# Patient Record
Sex: MALE | Race: WHITE | NOT HISPANIC OR LATINO | Employment: FULL TIME | URBAN - METROPOLITAN AREA
[De-identification: names, ages, dates, MRNs, and addresses within clinical notes are randomized per-mention and may not be internally consistent; named-entity substitution may affect disease eponyms.]

---

## 2024-10-09 ENCOUNTER — HOSPITAL ENCOUNTER (EMERGENCY)
Facility: HOSPITAL | Age: 34
Discharge: HOME | End: 2024-10-09
Payer: COMMERCIAL

## 2024-10-09 ENCOUNTER — PHARMACY VISIT (OUTPATIENT)
Dept: PHARMACY | Facility: CLINIC | Age: 34
End: 2024-10-09
Payer: MEDICAID

## 2024-10-09 ENCOUNTER — APPOINTMENT (OUTPATIENT)
Dept: URGENT CARE | Age: 34
End: 2024-10-09

## 2024-10-09 VITALS
BODY MASS INDEX: 22.5 KG/M2 | HEIGHT: 66 IN | WEIGHT: 140 LBS | RESPIRATION RATE: 16 BRPM | OXYGEN SATURATION: 97 % | HEART RATE: 90 BPM | DIASTOLIC BLOOD PRESSURE: 88 MMHG | SYSTOLIC BLOOD PRESSURE: 130 MMHG | TEMPERATURE: 98 F

## 2024-10-09 DIAGNOSIS — K08.89 PAIN, DENTAL: Primary | ICD-10-CM

## 2024-10-09 PROCEDURE — 99283 EMERGENCY DEPT VISIT LOW MDM: CPT

## 2024-10-09 PROCEDURE — 2500000001 HC RX 250 WO HCPCS SELF ADMINISTERED DRUGS (ALT 637 FOR MEDICARE OP): Performed by: PHYSICIAN ASSISTANT

## 2024-10-09 PROCEDURE — RXMED WILLOW AMBULATORY MEDICATION CHARGE

## 2024-10-09 RX ORDER — CLINDAMYCIN HYDROCHLORIDE 150 MG/1
300 CAPSULE ORAL ONCE
Status: COMPLETED | OUTPATIENT
Start: 2024-10-09 | End: 2024-10-09

## 2024-10-09 RX ORDER — CLINDAMYCIN HYDROCHLORIDE 300 MG/1
300 CAPSULE ORAL 3 TIMES DAILY
Qty: 30 CAPSULE | Refills: 0 | Status: SHIPPED | OUTPATIENT
Start: 2024-10-09 | End: 2024-10-19

## 2024-10-09 ASSESSMENT — PAIN DESCRIPTION - LOCATION: LOCATION: MOUTH

## 2024-10-09 ASSESSMENT — LIFESTYLE VARIABLES
EVER FELT BAD OR GUILTY ABOUT YOUR DRINKING: NO
HAVE YOU EVER FELT YOU SHOULD CUT DOWN ON YOUR DRINKING: NO
EVER HAD A DRINK FIRST THING IN THE MORNING TO STEADY YOUR NERVES TO GET RID OF A HANGOVER: NO
HAVE PEOPLE ANNOYED YOU BY CRITICIZING YOUR DRINKING: NO
TOTAL SCORE: 0

## 2024-10-09 ASSESSMENT — PAIN DESCRIPTION - ORIENTATION: ORIENTATION: LEFT

## 2024-10-09 ASSESSMENT — PAIN - FUNCTIONAL ASSESSMENT: PAIN_FUNCTIONAL_ASSESSMENT: 0-10

## 2024-10-09 ASSESSMENT — PAIN SCALES - GENERAL: PAINLEVEL_OUTOF10: 8

## 2024-10-09 ASSESSMENT — PAIN DESCRIPTION - PAIN TYPE: TYPE: ACUTE PAIN

## 2024-10-09 NOTE — PROGRESS NOTES
Medication Education     Medication education for Irineo Pinto was provided to the patient  for the following medication(s):  clindamycin    Medication education provided by a Pharmacist:  ADR Counseling Dose, frequency, storage How to take and what to do if a dose is missed How the medication works and benefits of taking it Benefits of taking the medication  Importance of compliance Potential duration of therapy    Identified potential barriers to education:  None    Method(s) of Education:  Verbal    An opportunity to ask questions and receive answers was provided.     Assessment of understanding the patient :  2= meets goals/outcomes    Additional Notes (if applicable):     ADILSON YOUNG

## 2024-10-09 NOTE — ED PROVIDER NOTES
EMERGENCY MEDICINE EVALUATION NOTE    History of Present Illness     Chief Complaint:   Chief Complaint   Patient presents with    Dental Pain     C/O left side dental pain, root canal scheduled at end of the month.       HPI: Irineo Pinto is a 34 y.o. male presents with a chief complaint of left-sided dental pain and swelling.  Patient reports that there is swelling on the left lower jawline.  He reports that he is not sure which tooth is bothering him but he has some swelling along the jawline and is concerned for infection.  He states that he did schedule an appointment with his dentist but they were unable to get him until the end of the month.  Patient denies difficulty breathing difficulty swallowing but he is concerned for infection.  Patient reports that he takes Tylenol Motrin at home which does relieve his pain.  Patient denies any fevers or chills.    Previous History   No past medical history on file.  No past surgical history on file.     No family history on file.  No Known Allergies  Current Outpatient Medications   Medication Instructions    clindamycin (CLEOCIN) 300 mg, oral, 3 times daily       Physical Exam     Appearance: Alert, oriented , cooperative     Skin: Intact,  dry skin, no lesions, rash, petechiae or purpura.      Eyes: PERRLA, EOMs intact,  Conjunctiva pink      ENT: Hearing grossly intact. Pharynx clear, uvula midline.  Patient has significant swelling to his left lower jawline but nothing possibly under the mandible.  There is no submandibular swelling, lockjaw, or trismus.  Patient speaking in full sentences and able to handle secretions appropriately.     Neck: Supple. Trachea at midline.      Pulmonary: Clear bilaterally. No rales, rhonchi or wheezing. No accessory muscle use or stridor.     Cardiac: Normal rate and rhythm without murmur     Abdomen: Soft, nontender, active bowel sounds.     Musculoskeletal: Full range of motion.      Neurological:Cranial nerves II through XII  "are grossly intact, normal sensation, no weakness, no focal findings identified.     Results   Labs Reviewed - No data to display  No orders to display         ED Course & Medical Decision Making     Medications   clindamycin (Cleocin) capsule 300 mg (300 mg oral Given 10/9/24 1449)     Heart Rate:  []   Temperature:  [36.7 °C (98 °F)-36.8 °C (98.2 °F)]   Respirations:  [16-20]   BP: (130-134)/(88-90)   Height:  [167.6 cm (5' 6\")]   Weight:  [63.5 kg (140 lb)]   Pulse Ox:  [97 %-100 %]    ED Course as of 10/09/24 1510   Wed Oct 09, 2024   1442 Patient has swelling on the left lower jawline.  Patient has secretions and able to speak in full sentences.  No submandibular swelling, lockjaw, or trismus.  Musculoskeletal the patient.  He states he is fine taking Tylenol Motrin at home he already has a dental appointment scheduled.  He states that he is concern for spread of infection would like antibiotics.  Patient given 1 dose of clindamycin here in the emergency department sent home with minimizing home.  Patient was encouraged to follow-up with his dentist or return here immediately with any worsening symptoms. [CJ]      ED Course User Index  [CJ] Joe Goldman PA-C         Diagnoses as of 10/09/24 1510   Pain, dental       Procedures   Procedures    Diagnosis     1. Pain, dental        Disposition   Discharged    ED Prescriptions       Medication Sig Dispense Start Date End Date Auth. Provider    clindamycin (Cleocin) 300 mg capsule Take 1 capsule (300 mg) by mouth 3 times a day for 10 days. 30 capsule 10/9/2024 10/19/2024 Joe Goldman PA-C            Disclaimer: This note was dictated by speech recognition. Minor errors in transcription may be present. Please call if questions.       Joe Goldman PA-C  10/09/24 1513    "

## 2024-10-09 NOTE — ED NOTES
Pt OK for d/c home per provider. All results and findings discussed with patient by provider. Home care and follow up instructions provided to patient. All questions answered. Pt verbalized understanding of all information. Pt A&Ox4, resp easy, skin warm dry and of appropriate color. Departs ED with steady gait.      Dayan Canales RN  10/09/24 1500

## 2024-12-19 ENCOUNTER — APPOINTMENT (OUTPATIENT)
Dept: UROLOGY | Facility: CLINIC | Age: 34
End: 2024-12-19
Payer: COMMERCIAL

## 2024-12-19 VITALS
SYSTOLIC BLOOD PRESSURE: 170 MMHG | DIASTOLIC BLOOD PRESSURE: 75 MMHG | BODY MASS INDEX: 24.16 KG/M2 | HEART RATE: 75 BPM | WEIGHT: 145 LBS | HEIGHT: 65 IN

## 2024-12-19 DIAGNOSIS — Z30.09 FAMILY PLANNING: Primary | ICD-10-CM

## 2024-12-19 PROCEDURE — 3008F BODY MASS INDEX DOCD: CPT | Performed by: UROLOGY

## 2024-12-19 PROCEDURE — 99202 OFFICE O/P NEW SF 15 MIN: CPT | Performed by: UROLOGY

## 2024-12-19 RX ORDER — ESCITALOPRAM OXALATE 10 MG/1
1 TABLET, FILM COATED ORAL DAILY
COMMUNITY
Start: 2024-12-17 | End: 2025-01-16

## 2024-12-19 RX ORDER — DIAZEPAM 5 MG/1
5 TABLET ORAL AS NEEDED
Qty: 1 TABLET | Refills: 0 | Status: SHIPPED | OUTPATIENT
Start: 2024-12-19 | End: 2024-12-26

## 2024-12-19 NOTE — PROGRESS NOTES
12/19/2024  We discussed scalpeless vasectomy procedure, the indications and potential side effects and complications. Patient expressed understanding and agreed to proceed the procedure.    Plan  Valium 5 mg 1 hour before procedure  Vasectomy    I have personally reviewed the OARRS report for this patient. This report is scanned into the electronic medical record. I have considered the risk of abuse, dependence, addictions and diversion. I believe that it is clinically appropriate for this patient to be prescribed this medication    Chief Complaint   Patient presents with    elective sterilization     Patient is here today for new patient visit for vasectomy consult.  Todd currently has 5 children and does not use birth control.        Physical Exam     TODAYS LAB RESULTS:      ASSESSMENT&PLAN:      IMPRESSIONS:

## 2025-02-06 ENCOUNTER — TELEPHONE (OUTPATIENT)
Dept: UROLOGY | Facility: CLINIC | Age: 35
End: 2025-02-06
Payer: COMMERCIAL

## 2025-02-06 ENCOUNTER — PHARMACY VISIT (OUTPATIENT)
Dept: PHARMACY | Facility: CLINIC | Age: 35
End: 2025-02-06
Payer: MEDICAID

## 2025-02-06 DIAGNOSIS — Z30.09 FAMILY PLANNING: ICD-10-CM

## 2025-02-06 PROCEDURE — RXMED WILLOW AMBULATORY MEDICATION CHARGE

## 2025-02-06 RX ORDER — DIAZEPAM 5 MG/1
5 TABLET ORAL AS NEEDED
Qty: 1 TABLET | Refills: 0 | Status: SHIPPED | OUTPATIENT
Start: 2025-02-06 | End: 2025-02-13

## 2025-02-06 NOTE — TELEPHONE ENCOUNTER
Irineo Pinto  [birth year]   please send a valium for pt  tomorrow is his vasectomy   his pharmacy is [facility] in [place]   thank you

## 2025-02-07 ENCOUNTER — APPOINTMENT (OUTPATIENT)
Dept: UROLOGY | Facility: CLINIC | Age: 35
End: 2025-02-07
Payer: COMMERCIAL

## 2025-02-07 VITALS
BODY MASS INDEX: 24.16 KG/M2 | HEIGHT: 65 IN | WEIGHT: 145 LBS | HEART RATE: 71 BPM | DIASTOLIC BLOOD PRESSURE: 76 MMHG | SYSTOLIC BLOOD PRESSURE: 138 MMHG

## 2025-02-07 DIAGNOSIS — Z98.52 STATUS POST VASECTOMY: Primary | ICD-10-CM

## 2025-02-07 DIAGNOSIS — Z30.09 FAMILY PLANNING: ICD-10-CM

## 2025-02-07 PROCEDURE — 55250 REMOVAL OF SPERM DUCT(S): CPT | Performed by: UROLOGY

## 2025-02-07 RX ORDER — LIDOCAINE HYDROCHLORIDE 10 MG/ML
10 INJECTION, SOLUTION INFILTRATION; PERINEURAL ONCE
Status: COMPLETED | OUTPATIENT
Start: 2025-02-07 | End: 2025-02-07

## 2025-02-07 RX ORDER — CEPHALEXIN 500 MG/1
500 CAPSULE ORAL 2 TIMES DAILY
Qty: 14 CAPSULE | Refills: 0 | Status: SHIPPED | OUTPATIENT
Start: 2025-02-07 | End: 2025-02-14

## 2025-02-07 RX ORDER — HYDROCODONE BITARTRATE AND ACETAMINOPHEN 5; 325 MG/1; MG/1
1 TABLET ORAL EVERY 6 HOURS PRN
Qty: 20 TABLET | Refills: 0 | Status: SHIPPED | OUTPATIENT
Start: 2025-02-07 | End: 2025-02-14

## 2025-02-07 RX ADMIN — LIDOCAINE HYDROCHLORIDE 10 ML: 10 INJECTION, SOLUTION INFILTRATION; PERINEURAL at 08:48

## 2025-02-07 NOTE — PATIENT INSTRUCTIONS
Impression  Family planning  Status post vasectomy    Plan  Norco ×20  Keflex 500 mg twice daily ×7 days  Appointment with specimen in 10 weeks

## 2025-02-07 NOTE — PROGRESS NOTES
02/07/2025 A scalpeless vasectomy was performed under local and patient tolerated it well.    the patient was prepped and draped in the usual sterile fashion.  While in the supine position, the left vas deferens was isolated and the skin and vas were anesthetized using 1% lidocaine.  A small incision was made in the skin using a scalpeless clamp.  The vas was delivered up into the wound and a segment was freed up and divided.  The proximal and distal ends were fulgurated. Fascial interposition stitch placed using 3-0 chromic   The ends of the vas were placed back into the scrotum and the skin was closed with all suture.  The right vas deferens was isolated and the skin and vas were anesthetized using 1% lidocaine.  A small incision was made in the skin using a scalpeless clamp.   the vas was delivered up into the wound and a segment was freed up and divided.  The proximal and distal ends were fulgurated and tied with 3-0 Chromic suture.  The ends of the vas were placed back into the scrotum and the skin was closed with suture.  The patient tolerated the procedure well and was discharged to home. He was provided with post-vasectomy instructions. He was advised to rest for 24 hours and abstain from sexual intercourse for 1 week. He was advised for the continued need for contraception until he is documented as sterile with semen analysis.    Impression  Family planning  Status post vasectomy    Plan  Norco ×20  Keflex 500 mg twice daily ×7 days  Appointment with specimen in 10 weeks      I have personally reviewed the OARRS report for this patient. This report is scanned into the electronic medical record. I have considered the risk of abuse, dependence, addictions and diversion. I believe that it is clinically appropriate for this patient to be prescribed this medication    Chief Complaint   Patient presents with    elective sterilization     Patient is here today for bilateral vasectomy.        Physical Exam      TODAYS LAB RESULTS:      ASSESSMENT&PLAN:      IMPRESSIONS:          12/19/2024  We discussed scalpeless vasectomy procedure, the indications and potential side effects and complications. Patient expressed understanding and agreed to proceed the procedure.     Plan  Valium 5 mg 1 hour before procedure  Vasectomy

## 2025-04-04 ENCOUNTER — APPOINTMENT (OUTPATIENT)
Dept: UROLOGY | Facility: CLINIC | Age: 35
End: 2025-04-04
Payer: COMMERCIAL

## 2025-04-04 VITALS — DIASTOLIC BLOOD PRESSURE: 65 MMHG | SYSTOLIC BLOOD PRESSURE: 101 MMHG | HEART RATE: 50 BPM

## 2025-04-04 DIAGNOSIS — Z98.52 STATUS POST VASECTOMY: Primary | ICD-10-CM

## 2025-04-04 PROCEDURE — 99024 POSTOP FOLLOW-UP VISIT: CPT | Performed by: UROLOGY

## 2025-04-04 NOTE — PROGRESS NOTES
04/04/2025  Patient did fine     Exam: Incision healed     Plan:  Drop off second specimen in a week    Chief Complaint   Patient presents with    Post-op     Pt is here today for his first specimen drop post vasectomy. He denies any issues since procedure.        Physical Exam     TODAYS LAB RESULTS:      ASSESSMENT&PLAN:      IMPRESSIONS:         02/07/2025 A scalpeless vasectomy was performed under local and patient tolerated it well.     the patient was prepped and draped in the usual sterile fashion.  While in the supine position, the left vas deferens was isolated and the skin and vas were anesthetized using 1% lidocaine.  A small incision was made in the skin using a scalpeless clamp.  The vas was delivered up into the wound and a segment was freed up and divided.  The proximal and distal ends were fulgurated. Fascial interposition stitch placed using 3-0 chromic   The ends of the vas were placed back into the scrotum and the skin was closed with all suture.  The right vas deferens was isolated and the skin and vas were anesthetized using 1% lidocaine.  A small incision was made in the skin using a scalpeless clamp.   the vas was delivered up into the wound and a segment was freed up and divided.  The proximal and distal ends were fulgurated and tied with 3-0 Chromic suture.  The ends of the vas were placed back into the scrotum and the skin was closed with suture.  The patient tolerated the procedure well and was discharged to home. He was provided with post-vasectomy instructions. He was advised to rest for 24 hours and abstain from sexual intercourse for 1 week. He was advised for the continued need for contraception until he is documented as sterile with semen analysis.     Impression  Family planning  Status post vasectomy     Plan  Norco ×20  Keflex 500 mg twice daily ×7 days  Appointment with specimen in 10 weeks        I have personally reviewed the OARRS report for this patient. This report is  scanned into the electronic medical record. I have considered the risk of abuse, dependence, addictions and diversion. I believe that it is clinically appropriate for this patient to be prescribed this medication          Chief Complaint   Patient presents with    elective sterilization       Patient is here today for bilateral vasectomy.         Physical Exam      TODAYS LAB RESULTS:        ASSESSMENT&PLAN:        IMPRESSIONS:           12/19/2024  We discussed scalpeless vasectomy procedure, the indications and potential side effects and complications. Patient expressed understanding and agreed to proceed the procedure.     Plan  Valium 5 mg 1 hour before procedure  Vasectomy

## 2025-04-14 ENCOUNTER — APPOINTMENT (OUTPATIENT)
Dept: UROLOGY | Facility: CLINIC | Age: 35
End: 2025-04-14
Payer: COMMERCIAL

## 2025-04-14 DIAGNOSIS — Z98.52 STATUS POST VASECTOMY: Primary | ICD-10-CM

## 2025-04-14 PROCEDURE — 99024 POSTOP FOLLOW-UP VISIT: CPT | Performed by: UROLOGY

## 2025-04-14 NOTE — PROGRESS NOTES
04/14/2025  Second semen specimen: No sperm    Successful vasectomy    Chief Complaint   Patient presents with    Post-op     Pt is here today for a second post vas drop off.         Physical Exam     TODAYS LAB RESULTS:      ASSESSMENT&PLAN:      IMPRESSIONS:         04/04/2025  Patient did fine     Exam: Incision healed     Plan:  Drop off second specimen in a week          Chief Complaint   Patient presents with    Post-op       Pt is here today for his first specimen drop post vasectomy. He denies any issues since procedure.         Physical Exam      TODAYS LAB RESULTS:        ASSESSMENT&PLAN:        IMPRESSIONS:          02/07/2025 A scalpeless vasectomy was performed under local and patient tolerated it well.     the patient was prepped and draped in the usual sterile fashion.  While in the supine position, the left vas deferens was isolated and the skin and vas were anesthetized using 1% lidocaine.  A small incision was made in the skin using a scalpeless clamp.  The vas was delivered up into the wound and a segment was freed up and divided.  The proximal and distal ends were fulgurated. Fascial interposition stitch placed using 3-0 chromic   The ends of the vas were placed back into the scrotum and the skin was closed with all suture.  The right vas deferens was isolated and the skin and vas were anesthetized using 1% lidocaine.  A small incision was made in the skin using a scalpeless clamp.   the vas was delivered up into the wound and a segment was freed up and divided.  The proximal and distal ends were fulgurated and tied with 3-0 Chromic suture.  The ends of the vas were placed back into the scrotum and the skin was closed with suture.  The patient tolerated the procedure well and was discharged to home. He was provided with post-vasectomy instructions. He was advised to rest for 24 hours and abstain from sexual intercourse for 1 week. He was advised for the continued need for contraception until he  is documented as sterile with semen analysis.     Impression  Family planning  Status post vasectomy     Plan  Norco ×20  Keflex 500 mg twice daily ×7 days  Appointment with specimen in 10 weeks        I have personally reviewed the OARRS report for this patient. This report is scanned into the electronic medical record. I have considered the risk of abuse, dependence, addictions and diversion. I believe that it is clinically appropriate for this patient to be prescribed this medication             Chief Complaint   Patient presents with    elective sterilization       Patient is here today for bilateral vasectomy.         Physical Exam      TODAYS LAB RESULTS:        ASSESSMENT&PLAN:        IMPRESSIONS:           12/19/2024  We discussed scalpeless vasectomy procedure, the indications and potential side effects and complications. Patient expressed understanding and agreed to proceed the procedure.     Plan  Valium 5 mg 1 hour before procedure  Vasectomy